# Patient Record
Sex: MALE | Race: WHITE | NOT HISPANIC OR LATINO | URBAN - METROPOLITAN AREA
[De-identification: names, ages, dates, MRNs, and addresses within clinical notes are randomized per-mention and may not be internally consistent; named-entity substitution may affect disease eponyms.]

---

## 2022-06-16 ENCOUNTER — OFFICE (OUTPATIENT)
Dept: URBAN - METROPOLITAN AREA CLINIC 11 | Facility: CLINIC | Age: 82
End: 2022-06-16

## 2022-06-16 VITALS
OXYGEN SATURATION: 98 % | DIASTOLIC BLOOD PRESSURE: 60 MMHG | WEIGHT: 175 LBS | HEART RATE: 55 BPM | SYSTOLIC BLOOD PRESSURE: 146 MMHG | HEIGHT: 72 IN

## 2022-06-16 DIAGNOSIS — K40.90 UNILATERAL INGUINAL HERNIA, WITHOUT OBSTRUCTION OR GANGRENE,: ICD-10-CM

## 2022-06-16 DIAGNOSIS — K52.1 TOXIC GASTROENTERITIS AND COLITIS: ICD-10-CM

## 2022-06-16 PROCEDURE — 99203 OFFICE O/P NEW LOW 30 MIN: CPT

## 2024-04-26 ENCOUNTER — OFFICE (OUTPATIENT)
Dept: URBAN - METROPOLITAN AREA CLINIC 11 | Facility: CLINIC | Age: 84
End: 2024-04-26

## 2024-04-26 VITALS
OXYGEN SATURATION: 98 % | DIASTOLIC BLOOD PRESSURE: 58 MMHG | HEART RATE: 53 BPM | HEIGHT: 72 IN | SYSTOLIC BLOOD PRESSURE: 124 MMHG | WEIGHT: 179 LBS

## 2024-04-26 DIAGNOSIS — R10.84 GENERALIZED ABDOMINAL PAIN: ICD-10-CM

## 2024-04-26 DIAGNOSIS — K40.90 UNILATERAL INGUINAL HERNIA, WITHOUT OBSTRUCTION OR GANGRENE,: ICD-10-CM

## 2024-04-26 DIAGNOSIS — K56.41 FECAL IMPACTION: ICD-10-CM

## 2024-04-26 DIAGNOSIS — K59.00 CONSTIPATION, UNSPECIFIED: ICD-10-CM

## 2024-04-26 PROCEDURE — 99214 OFFICE O/P EST MOD 30 MIN: CPT | Performed by: STUDENT IN AN ORGANIZED HEALTH CARE EDUCATION/TRAINING PROGRAM

## 2024-04-26 NOTE — SERVICEHPINOTES
Mr. Georgi Huynh is an 82 yo male with past medical history of hypertension, hyperlipidemia, who presents to [facility] 1 as an ER follow-up for recent upper abdominal pain and fecal impaction.  
br
br Clinic visit 04/26/2024: 
br patient is here with his wife today.  He recently had an ER visit in late March 2024 for abdominal pain and constipation.  His labs showed his hemoglobin was 15, platelets normal, LFTs normal, CT scan of the abdomen pelvis showed large stool burden and large fecal ball in the rectum.  He previous to that was being treated for chronic diarrhea and was given colestipol that he had been taking.  He has not on any iron pills.  It seems he was having diarrhea prior to his acute episode.  I think likely he had overflow diarrhea secondary to large stool burden.  In the emergency room he was given an enema which helped relieve his symptoms and since then he has had no issues with having a bowel movement 1-2 times per day.  He has not on any stool softeners or any fiber supplements at this time.  He has not on any blood thinners.  His last colonoscopy was in 2005 and since then he has had a few different Cologuard test but he is above the screening age for colon cancers.  his stool is occasionally dark colored after his wife gives him Pepto-Bismol.  He has had 1 slight episode of upper abdominal pain after his ER visit but it resolved spontaneously.

## 2024-04-26 NOTE — SERVICENOTES
Discussed with patient to increase his fiber, use daily MiraLax, he can use prunes once a day if he does not like taking fiber supplements.  Personally reviewed his previous medical records for his visit today.  He is overall feeling well.  He is likely not a candidate for his right inguinal hernia repair as was the case 2 years ago.  Return to clinic in 6 months or sooner if needed.  No indication for colonoscopy at this time given his age.  All questions addressed.  I think likely his dark stool is caused by Pepto-Bismol intermittent use.  His hemoglobin is normal so I think we can hold off on endoscopy.  I counseled the patient on alarm features to watch out for in case he shows any signs of inguinal hernia incarceration or strangulation.  Patient and his wife verbalized understanding and agreement.

## 2024-10-30 ENCOUNTER — OFFICE (OUTPATIENT)
Dept: URBAN - METROPOLITAN AREA CLINIC 11 | Facility: CLINIC | Age: 84
End: 2024-10-30
Payer: COMMERCIAL

## 2024-10-30 VITALS
DIASTOLIC BLOOD PRESSURE: 60 MMHG | HEART RATE: 46 BPM | SYSTOLIC BLOOD PRESSURE: 144 MMHG | OXYGEN SATURATION: 88 % | WEIGHT: 179 LBS | HEIGHT: 72 IN

## 2024-10-30 DIAGNOSIS — K40.90 UNILATERAL INGUINAL HERNIA, WITHOUT OBSTRUCTION OR GANGRENE,: ICD-10-CM

## 2024-10-30 DIAGNOSIS — K59.00 CONSTIPATION, UNSPECIFIED: ICD-10-CM

## 2024-10-30 PROCEDURE — 99213 OFFICE O/P EST LOW 20 MIN: CPT | Performed by: STUDENT IN AN ORGANIZED HEALTH CARE EDUCATION/TRAINING PROGRAM
